# Patient Record
Sex: FEMALE | Race: BLACK OR AFRICAN AMERICAN | NOT HISPANIC OR LATINO | URBAN - METROPOLITAN AREA
[De-identification: names, ages, dates, MRNs, and addresses within clinical notes are randomized per-mention and may not be internally consistent; named-entity substitution may affect disease eponyms.]

---

## 2021-03-25 ENCOUNTER — TELEPHONE ENCOUNTER (OUTPATIENT)
Dept: URBAN - METROPOLITAN AREA CLINIC 40 | Facility: CLINIC | Age: 51
End: 2021-03-25

## 2021-03-25 ENCOUNTER — WEB ENCOUNTER (OUTPATIENT)
Dept: URBAN - METROPOLITAN AREA CLINIC 40 | Facility: CLINIC | Age: 51
End: 2021-03-25

## 2021-03-25 ENCOUNTER — OFFICE VISIT (OUTPATIENT)
Dept: URBAN - METROPOLITAN AREA CLINIC 40 | Facility: CLINIC | Age: 51
End: 2021-03-25
Payer: MEDICARE

## 2021-03-25 DIAGNOSIS — K29.70 GASTRITIS: ICD-10-CM

## 2021-03-25 DIAGNOSIS — K21.9 GERD: ICD-10-CM

## 2021-03-25 DIAGNOSIS — Z12.11 ENCOUNTER FOR SCREENING COLONOSCOPY: ICD-10-CM

## 2021-03-25 DIAGNOSIS — E66.01 MORBID OBESITY: ICD-10-CM

## 2021-03-25 DIAGNOSIS — Z80.0 FAMILY HISTORY OF COLON CANCER: ICD-10-CM

## 2021-03-25 DIAGNOSIS — K76.0 FATTY LIVER: ICD-10-CM

## 2021-03-25 DIAGNOSIS — Z95.0 PACEMAKER: ICD-10-CM

## 2021-03-25 DIAGNOSIS — Z98.84 S/P BARIATRIC SURGERY: ICD-10-CM

## 2021-03-25 PROCEDURE — 99204 OFFICE O/P NEW MOD 45 MIN: CPT | Performed by: PHYSICIAN ASSISTANT

## 2021-03-25 RX ORDER — DAPAGLIFLOZIN 10 MG/1
1 TABLET TABLET, FILM COATED ORAL ONCE A DAY
Status: ACTIVE | COMMUNITY

## 2021-03-25 RX ORDER — CARVEDILOL 25 MG/1
1 TABLET WITH FOOD TABLET, FILM COATED ORAL TWICE A DAY
Status: ACTIVE | COMMUNITY

## 2021-03-25 RX ORDER — METFORMIN HYDROCHLORIDE 1000 MG/1
1 TABLET WITH A MEAL TABLET, FILM COATED ORAL TWICE DAILY
Status: ACTIVE | COMMUNITY

## 2021-03-25 RX ORDER — SODIUM, POTASSIUM,MAG SULFATES 17.5-3.13G
ONCE SOLUTION, RECONSTITUTED, ORAL ORAL
Qty: 1 KIT | Refills: 0 | OUTPATIENT
Start: 2021-03-25 | End: 2021-03-26

## 2021-03-25 RX ORDER — PANTOPRAZOLE SODIUM 40 MG/1
1 TABLET TABLET, DELAYED RELEASE ORAL ONCE A DAY
Qty: 30 | Refills: 2 | OUTPATIENT

## 2021-03-25 RX ORDER — SPIRONOLACTONE 25 MG/1
1 TABLET TABLET, FILM COATED ORAL
Status: ACTIVE | COMMUNITY

## 2021-03-25 NOTE — HPI-TODAY'S VISIT:
She presents to the office today to schedule her screening colonoscopy.  States that she has mild constipation as she has been more sedentary.  Often skips meals.  Trying to increase her water intake.  Has chronic hip back in joint pain, soon to start aquatic physical therapy.  Denies any rectal bleeding or melena.  Takes OTC laxatives as needed.  Denies nausea or vomiting.  No dysphagia, however admits that she is having recurrent heartburn.  Believes that spicy food often triggers this as well as other acid producing foods.  If she avoids these, oftentimes she can avoid reflux.  When the reflux occurs, it may be after her heaviest meal of the day in the evening and reflux towards the neck.  Not currently on any acid reduction therapy.  Morbidly obese.  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  No myocardial infarction or stroke.  Not on any anticoagulant therapy.

## 2021-03-25 NOTE — PHYSICAL EXAM GASTROINTESTINAL
Abdomen  Obese, soft, nontender, nondistended , no guarding or rigidity , no masses palpable , normal bowel sounds , Liver and Spleen , no hepatomegaly present , no hepatosplenomegaly , liver nontender , spleen not palpable

## 2021-03-25 NOTE — PHYSICAL EXAM CONSTITUTIONAL:
well developed, morbidly obese, in no acute distress , ambulating without difficulty , normal communication ability

## 2021-03-25 NOTE — HPI-TODAY'S VISIT:
Mrs. Bruno is a 50 year old /Black female, who presents for screening colonoscopy. Last seen in 2015 with complaint of abdominal pain which was described as moderate to severe and sharp occurring anytime, and lasts minutes at a time. It did occur in the LUQ and improved by lying still and is aggravated by physical activity . A CT A/P at [facility] reportedly noted fatty liver, but was otherwise normal, per patient. Patient has a history of heartburn. Denies N/V or dysphagia. 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 Denies use of NSAIDs. Anemia following gastric surgery.An EGD in November 2015 with Dr. Sutherland was essentially normal outside of mild inflammation at the gastrojejunal anastomosis. She gives a family history of colon cancer in a first degree relative. Her November 2015 colonoscopy was normal.

## 2021-04-20 ENCOUNTER — OFFICE VISIT (OUTPATIENT)
Dept: URBAN - METROPOLITAN AREA MEDICAL CENTER 9 | Facility: MEDICAL CENTER | Age: 51
End: 2021-04-20

## 2021-05-04 ENCOUNTER — OFFICE VISIT (OUTPATIENT)
Dept: URBAN - METROPOLITAN AREA CLINIC 40 | Facility: CLINIC | Age: 51
End: 2021-05-04

## 2021-05-31 NOTE — PHYSICAL EXAM NECK/THYROID:
Medication Question or Clarification  Who is calling: Pharmacy: [facility]  What medication are you calling about? (include dose and sig) Losartan-HCTZ 100-25 mg tablet, 1 daily  Who prescribed the medication?: Dang Don, ZULEIKA   What is your question/concern?: Medication curently on backorder.  Please send prescription for losartan 100 mg and HCTZ 25 mg?  Pharmacy: [facility] #[id number]  Okay to leave a detailed message?: No  Site CMT - Please call the pharmacy to obtain any additional needed information.   normal appearance , trachea midline

## 2021-07-12 ENCOUNTER — ERX REFILL RESPONSE (OUTPATIENT)
Dept: URBAN - METROPOLITAN AREA CLINIC 40 | Facility: CLINIC | Age: 51
End: 2021-07-12

## 2021-07-12 RX ORDER — PANTOPRAZOLE SODIUM 40 MG/1
TAKE ONE TABLET BY MOUTH ONE TIME DAILY TABLET, DELAYED RELEASE ORAL
Qty: 30 TABLET | Refills: 3 | OUTPATIENT

## 2021-07-12 RX ORDER — PANTOPRAZOLE SODIUM 40 MG/1
1 TABLET TABLET, DELAYED RELEASE ORAL ONCE A DAY
Qty: 30 | Refills: 2 | OUTPATIENT

## 2021-07-21 ENCOUNTER — TELEPHONE ENCOUNTER (OUTPATIENT)
Dept: URBAN - METROPOLITAN AREA CLINIC 98 | Facility: CLINIC | Age: 51
End: 2021-07-21

## 2021-08-04 ENCOUNTER — OFFICE VISIT (OUTPATIENT)
Dept: URBAN - METROPOLITAN AREA CLINIC 40 | Facility: CLINIC | Age: 51
End: 2021-08-04

## 2021-08-04 RX ORDER — SPIRONOLACTONE 25 MG/1
1 TABLET TABLET, FILM COATED ORAL
Status: ACTIVE | COMMUNITY

## 2021-08-04 RX ORDER — DAPAGLIFLOZIN 10 MG/1
1 TABLET TABLET, FILM COATED ORAL ONCE A DAY
Status: ACTIVE | COMMUNITY

## 2021-08-04 RX ORDER — METFORMIN HYDROCHLORIDE 1000 MG/1
1 TABLET WITH A MEAL TABLET, FILM COATED ORAL TWICE DAILY
Status: ACTIVE | COMMUNITY

## 2021-08-04 RX ORDER — PANTOPRAZOLE SODIUM 40 MG/1
TAKE ONE TABLET BY MOUTH ONE TIME DAILY TABLET, DELAYED RELEASE ORAL
Qty: 30 TABLET | Refills: 3 | Status: ACTIVE | COMMUNITY

## 2021-08-04 RX ORDER — CARVEDILOL 25 MG/1
1 TABLET WITH FOOD TABLET, FILM COATED ORAL TWICE A DAY
Status: ACTIVE | COMMUNITY

## 2021-08-04 RX ORDER — PANTOPRAZOLE SODIUM 40 MG/1
1 TABLET TABLET, DELAYED RELEASE ORAL ONCE A DAY
Qty: 30 | Refills: 2 | OUTPATIENT

## 2021-08-04 NOTE — HPI-TODAY'S VISIT:
Mrs. Bruno is a 50 year old /Black female, who presents for screening colonoscopy. Last seen in 2015 with complaint of abdominal pain which was described as moderate to severe and sharp occurring anytime, and lasts minutes at a time. It did occur in the LUQ and improved by lying still and is aggravated by physical activity . A CT A/P at [facility] reportedly noted fatty liver, but was otherwise normal, per patient. Patient has a history of heartburn. Denies N/V or dysphagia. 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 Denies use of NSAIDs. Anemia following gastric surgery.An EGD in November 2015 with Dr. Sutherland was essentially normal outside of mild inflammation at the gastrojejunal anastomosis. She gives a family history of colon cancer in a first degree relative. Her November 2015 colonoscopy was normal.

## 2021-08-04 NOTE — HPI-TODAY'S VISIT:
She presents to the office today to schedule her screening colonoscopy.  States that she has mild constipation as she has been more sedentary.  Often skips meals.  Trying to increase her water intake.  Has chronic hip back in joint pain, soon to start aquatic physical therapy.  Denies any rectal bleeding or melena.  Takes OTC laxatives as needed.  Denies nausea or vomiting.  No dysphagia, however admits that she is having recurrent heartburn.  Believes that spicy food often triggers this as well as other acid producing foods.  If she avoids these, oftentimes she can avoid reflux.  When the reflux occurs, it may be after her heaviest meal of the day in the evening and reflux towards the neck.  Not currently on any acid reduction therapy.  Morbidly obese.  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  No myocardial infarction or stroke.  Not on any anticoagulant therapy.

## 2021-09-02 ENCOUNTER — OFFICE VISIT (OUTPATIENT)
Dept: URBAN - METROPOLITAN AREA CLINIC 40 | Facility: CLINIC | Age: 51
End: 2021-09-02
Payer: MEDICARE

## 2021-09-02 ENCOUNTER — WEB ENCOUNTER (OUTPATIENT)
Dept: URBAN - METROPOLITAN AREA CLINIC 40 | Facility: CLINIC | Age: 51
End: 2021-09-02

## 2021-09-02 VITALS
HEIGHT: 67 IN | TEMPERATURE: 97 F | HEART RATE: 66 BPM | DIASTOLIC BLOOD PRESSURE: 86 MMHG | SYSTOLIC BLOOD PRESSURE: 130 MMHG | WEIGHT: 293 LBS | BODY MASS INDEX: 45.99 KG/M2

## 2021-09-02 DIAGNOSIS — K29.70 GASTRITIS: ICD-10-CM

## 2021-09-02 DIAGNOSIS — K76.0 FATTY LIVER: ICD-10-CM

## 2021-09-02 DIAGNOSIS — K59.01 CONSTIPATION: ICD-10-CM

## 2021-09-02 DIAGNOSIS — K21.9 GERD: ICD-10-CM

## 2021-09-02 PROCEDURE — 99213 OFFICE O/P EST LOW 20 MIN: CPT | Performed by: PHYSICIAN ASSISTANT

## 2021-09-02 RX ORDER — CARVEDILOL 25 MG/1
1 TABLET WITH FOOD TABLET, FILM COATED ORAL TWICE A DAY
Status: ACTIVE | COMMUNITY

## 2021-09-02 RX ORDER — SODIUM, POTASSIUM,MAG SULFATES 17.5-3.13G
ONCE SOLUTION, RECONSTITUTED, ORAL ORAL
Qty: 1 KIT | Refills: 0 | OUTPATIENT
Start: 2021-09-03 | End: 2021-09-04

## 2021-09-02 RX ORDER — SPIRONOLACTONE 25 MG/1
1 TABLET TABLET, FILM COATED ORAL
Status: ACTIVE | COMMUNITY

## 2021-09-02 RX ORDER — METFORMIN HYDROCHLORIDE 1000 MG/1
1 TABLET WITH A MEAL TABLET, FILM COATED ORAL TWICE DAILY
Status: ACTIVE | COMMUNITY

## 2021-09-02 RX ORDER — PANTOPRAZOLE SODIUM 40 MG/1
1 TABLET TABLET, DELAYED RELEASE ORAL ONCE A DAY
Qty: 30 | Refills: 2 | Status: ACTIVE | COMMUNITY

## 2021-09-02 RX ORDER — PANTOPRAZOLE SODIUM 40 MG/1
1 TABLET TABLET, DELAYED RELEASE ORAL ONCE A DAY
Qty: 30 | Refills: 2 | OUTPATIENT

## 2021-09-02 RX ORDER — DAPAGLIFLOZIN 10 MG/1
1 TABLET TABLET, FILM COATED ORAL ONCE A DAY
Status: ACTIVE | COMMUNITY

## 2021-09-02 RX ORDER — PANTOPRAZOLE SODIUM 40 MG/1
TAKE ONE TABLET BY MOUTH ONE TIME DAILY TABLET, DELAYED RELEASE ORAL
Qty: 30 TABLET | Refills: 3 | Status: ACTIVE | COMMUNITY

## 2021-09-02 NOTE — HPI-TODAY'S VISIT:
Mrs. Bruno is a 51 year old /Black female, who presents for screening colonoscopy. Seen earlier this year to schedule procedures for evaluation of GERD, screening colonoscopy. Back in 2015, she was seen with complaint of abdominal pain which was described as moderate to severe and sharp occurring anytime, and lasts minutes at a time. It did occur in the LUQ and improved by lying still and is aggravated by physical activity . A CT A/P at [facility] reportedly noted fatty liver, but was otherwise normal, per patient. Patient has a history of heartburn. Denies N/V or dysphagia. 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 Denies use of NSAIDs. Anemia following gastric surgery.An EGD in November 2015 with Dr. Sutherland was essentially normal outside of mild inflammation at the gastrojejunal anastomosis. She gives a family history of colon cancer in a first degree relative. Her November 2015 colonoscopy was normal.  She returns to the office today to reschedule her screening colonoscopy.  States that she has mild constipation as she has been more sedentary.  Often skips meals.  Trying to increase her water intake.  Has chronic hip back in joint pain, soon to start aquatic physical therapy.  Denies any rectal bleeding or melena.  Takes OTC laxatives as needed.  Denies nausea or vomiting.  No dysphagia, however admits that she is having recurrent heartburn.  Believes that spicy food often triggers this as well as other acid producing foods.  If she avoids these, oftentimes she can avoid reflux.  When the reflux occurs, it may be after her heaviest meal of the day in the evening and reflux towards the neck.  Taking pantoprazole daily.  Morbidly obese.  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  No myocardial infarction or stroke.  Not on any anticoagulant therapy. Presents today to reschedule colonoscopy with pending CV clearance.

## 2021-09-30 ENCOUNTER — TELEPHONE ENCOUNTER (OUTPATIENT)
Dept: URBAN - METROPOLITAN AREA CLINIC 40 | Facility: CLINIC | Age: 51
End: 2021-09-30

## 2021-10-05 ENCOUNTER — TELEPHONE ENCOUNTER (OUTPATIENT)
Dept: URBAN - METROPOLITAN AREA CLINIC 40 | Facility: CLINIC | Age: 51
End: 2021-10-05

## 2022-01-06 ENCOUNTER — ERX REFILL RESPONSE (OUTPATIENT)
Dept: URBAN - METROPOLITAN AREA CLINIC 40 | Facility: CLINIC | Age: 52
End: 2022-01-06

## 2022-01-06 RX ORDER — PANTOPRAZOLE SODIUM 40 MG/1
TAKE ONE TABLET BY MOUTH ONE TIME DAILY TABLET, DELAYED RELEASE ORAL
Qty: 30 TABLET | Refills: 3 | OUTPATIENT

## 2022-04-06 ENCOUNTER — ERX REFILL RESPONSE (OUTPATIENT)
Dept: URBAN - METROPOLITAN AREA CLINIC 40 | Facility: CLINIC | Age: 52
End: 2022-04-06

## 2022-04-06 RX ORDER — PANTOPRAZOLE SODIUM 40 MG/1
TAKE ONE TABLET BY MOUTH ONE TIME DAILY TABLET, DELAYED RELEASE ORAL
Qty: 30 TABLET | Refills: 3 | OUTPATIENT

## 2022-06-22 ENCOUNTER — OFFICE VISIT (OUTPATIENT)
Dept: URBAN - METROPOLITAN AREA CLINIC 74 | Facility: CLINIC | Age: 52
End: 2022-06-22

## 2022-07-15 ENCOUNTER — ERX REFILL RESPONSE (OUTPATIENT)
Dept: URBAN - METROPOLITAN AREA CLINIC 40 | Facility: CLINIC | Age: 52
End: 2022-07-15

## 2022-07-15 RX ORDER — PANTOPRAZOLE SODIUM 40 MG/1
TAKE ONE TABLET BY MOUTH ONE TIME DAILY TABLET, DELAYED RELEASE ORAL
Qty: 30 TABLET | Refills: 2 | OUTPATIENT

## 2022-07-15 RX ORDER — PANTOPRAZOLE SODIUM 40 MG/1
TAKE ONE TABLET BY MOUTH ONE TIME DAILY TABLET, DELAYED RELEASE ORAL
Qty: 30 TABLET | Refills: 3 | OUTPATIENT

## 2022-07-18 ENCOUNTER — ERX REFILL RESPONSE (OUTPATIENT)
Dept: URBAN - METROPOLITAN AREA CLINIC 40 | Facility: CLINIC | Age: 52
End: 2022-07-18

## 2022-07-18 RX ORDER — PANTOPRAZOLE SODIUM 40 MG/1
TAKE ONE TABLET BY MOUTH ONE TIME DAILY TABLET, DELAYED RELEASE ORAL
Qty: 30 TABLET | Refills: 2 | OUTPATIENT

## 2022-07-18 RX ORDER — PANTOPRAZOLE SODIUM 40 MG/1
TAKE ONE TABLET BY MOUTH ONE TIME DAILY TABLET, DELAYED RELEASE ORAL
Qty: 30 TABLET | Refills: 3 | OUTPATIENT

## 2022-08-02 ENCOUNTER — OFFICE VISIT (OUTPATIENT)
Dept: URBAN - METROPOLITAN AREA CLINIC 40 | Facility: CLINIC | Age: 52
End: 2022-08-02
Payer: MEDICARE

## 2022-08-02 VITALS
WEIGHT: 293 LBS | HEART RATE: 100 BPM | HEIGHT: 67 IN | SYSTOLIC BLOOD PRESSURE: 128 MMHG | BODY MASS INDEX: 45.99 KG/M2 | TEMPERATURE: 97.6 F | DIASTOLIC BLOOD PRESSURE: 86 MMHG

## 2022-08-02 DIAGNOSIS — K59.01 CONSTIPATION: ICD-10-CM

## 2022-08-02 DIAGNOSIS — Z80.0 FAMILY HISTORY OF COLON CANCER: ICD-10-CM

## 2022-08-02 DIAGNOSIS — Z95.0 PACEMAKER: ICD-10-CM

## 2022-08-02 DIAGNOSIS — K21.9 GERD: ICD-10-CM

## 2022-08-02 PROBLEM — 238136002 MORBID OBESITY: Status: ACTIVE | Noted: 2021-03-25

## 2022-08-02 PROBLEM — 4556007 GASTRITIS: Status: ACTIVE | Noted: 2021-03-25

## 2022-08-02 PROCEDURE — 99213 OFFICE O/P EST LOW 20 MIN: CPT | Performed by: PHYSICIAN ASSISTANT

## 2022-08-02 RX ORDER — SPIRONOLACTONE 25 MG/1
1 TABLET TABLET, FILM COATED ORAL
Status: ACTIVE | COMMUNITY

## 2022-08-02 RX ORDER — DAPAGLIFLOZIN 10 MG/1
1 TABLET TABLET, FILM COATED ORAL ONCE A DAY
Status: ACTIVE | COMMUNITY

## 2022-08-02 RX ORDER — METFORMIN HYDROCHLORIDE 1000 MG/1
1 TABLET WITH A MEAL TABLET, FILM COATED ORAL TWICE DAILY
Status: ACTIVE | COMMUNITY

## 2022-08-02 RX ORDER — CARVEDILOL 25 MG/1
1 TABLET WITH FOOD TABLET, FILM COATED ORAL TWICE A DAY
Status: ACTIVE | COMMUNITY

## 2022-08-02 RX ORDER — PANTOPRAZOLE SODIUM 40 MG/1
TAKE ONE TABLET BY MOUTH ONE TIME DAILY TABLET, DELAYED RELEASE ORAL
Qty: 30 TABLET | Refills: 2 | Status: ACTIVE | COMMUNITY

## 2022-08-02 NOTE — HPI-TODAY'S VISIT:
Mrs. Bruno is a 52 year old Black female, last seen 9/2/21 for screening colonoscopy. Seen earlier this year to schedule procedures for evaluation of GERD, screening colonoscopy. Back in 2015, she was seen with complaint of abdominal pain which was described as moderate to severe and sharp occurring anytime, and lasts minutes at a time. It did occur in the LUQ and improved by lying still and is aggravated by physical activity . A CT A/P at [facility] reportedly noted fatty liver, but was otherwise normal, per patient. Patient has a history of heartburn. Denies N/V or dysphagia. 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 Denies use of NSAIDs. Anemia following gastric surgery.An EGD in November 2015 with Dr. Sutherland was essentially normal outside of mild inflammation at the gastrojejunal anastomosis. She gives a family history of colon cancer in a first degree relative. Her November 2015 colonoscopy was normal.  She returns to the office today to reschedule her screening colonoscopy.  States that she has mild constipation as she has been more sedentary.  Often skips meals.  Trying to increase her water intake.  Has chronic hip back in joint pain, soon to start aquatic physical therapy.  Denies any rectal bleeding or melena.  Takes OTC laxatives as needed.  Denies nausea or vomiting.  No dysphagia, however admits that she is having recurrent heartburn.  Believes that spicy food often triggers this as well as other acid producing foods.  If she avoids these, oftentimes she can avoid reflux.  When the reflux occurs, it may be after her heaviest meal of the day in the evening and reflux towards the neck.  Taking pantoprazole daily.  Morbidly obese.  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  No myocardial infarction or stroke.  Not on any anticoagulant therapy. Presents today to reschedule colonoscopy with pending CV clearance.

## 2022-08-04 PROBLEM — 441509002: Status: ACTIVE | Noted: 2021-03-25

## 2022-08-04 PROBLEM — 14760008 CONSTIPATION: Status: ACTIVE | Noted: 2021-09-02

## 2022-08-04 PROBLEM — 235595009 GASTROESOPHAGEAL REFLUX DISEASE: Status: ACTIVE | Noted: 2021-03-25

## 2022-08-05 ENCOUNTER — TELEPHONE ENCOUNTER (OUTPATIENT)
Dept: URBAN - METROPOLITAN AREA CLINIC 40 | Facility: CLINIC | Age: 52
End: 2022-08-05

## 2022-11-01 ENCOUNTER — OFFICE VISIT (OUTPATIENT)
Dept: URBAN - METROPOLITAN AREA MEDICAL CENTER 9 | Facility: MEDICAL CENTER | Age: 52
End: 2022-11-01

## 2022-11-23 ENCOUNTER — OFFICE VISIT (OUTPATIENT)
Dept: URBAN - METROPOLITAN AREA CLINIC 40 | Facility: CLINIC | Age: 52
End: 2022-11-23

## 2022-12-06 ENCOUNTER — OFFICE VISIT (OUTPATIENT)
Dept: URBAN - METROPOLITAN AREA MEDICAL CENTER 9 | Facility: MEDICAL CENTER | Age: 52
End: 2022-12-06

## 2022-12-27 ENCOUNTER — ERX REFILL RESPONSE (OUTPATIENT)
Dept: URBAN - METROPOLITAN AREA CLINIC 40 | Facility: CLINIC | Age: 52
End: 2022-12-27

## 2022-12-27 RX ORDER — PANTOPRAZOLE SODIUM 40 MG/1
TAKE ONE TABLET BY MOUTH ONE TIME DAILY TABLET, DELAYED RELEASE ORAL
Qty: 30 TABLET | Refills: 2 | OUTPATIENT

## 2023-03-17 ENCOUNTER — TELEPHONE ENCOUNTER (OUTPATIENT)
Dept: URBAN - METROPOLITAN AREA CLINIC 40 | Facility: CLINIC | Age: 53
End: 2023-03-17

## 2023-03-31 ENCOUNTER — OFFICE VISIT (OUTPATIENT)
Dept: URBAN - METROPOLITAN AREA TELEHEALTH 2 | Facility: TELEHEALTH | Age: 53
End: 2023-03-31

## 2023-03-31 ENCOUNTER — TELEPHONE ENCOUNTER (OUTPATIENT)
Dept: URBAN - METROPOLITAN AREA CLINIC 35 | Facility: CLINIC | Age: 53
End: 2023-03-31

## 2023-03-31 VITALS — WEIGHT: 293 LBS | HEIGHT: 67 IN | BODY MASS INDEX: 45.99 KG/M2

## 2023-03-31 RX ORDER — MELOXICAM 7.5 MG
1 TABLET TABLET ORAL ONCE A DAY
Status: ACTIVE | COMMUNITY

## 2023-03-31 RX ORDER — PANTOPRAZOLE SODIUM 40 MG/1
TAKE ONE TABLET BY MOUTH ONE TIME DAILY TABLET, DELAYED RELEASE ORAL
Qty: 30 TABLET | Refills: 2 | Status: ACTIVE | COMMUNITY

## 2023-03-31 RX ORDER — SPIRONOLACTONE 25 MG/1
1 TABLET TABLET, FILM COATED ORAL
Status: ACTIVE | COMMUNITY

## 2023-03-31 RX ORDER — METFORMIN HYDROCHLORIDE 1000 MG/1
1 TABLET WITH A MEAL TABLET, FILM COATED ORAL TWICE DAILY
Status: ACTIVE | COMMUNITY

## 2023-03-31 RX ORDER — DAPAGLIFLOZIN 10 MG/1
1 TABLET TABLET, FILM COATED ORAL ONCE A DAY
Status: ACTIVE | COMMUNITY

## 2023-03-31 RX ORDER — CARVEDILOL 25 MG/1
1 TABLET WITH FOOD TABLET, FILM COATED ORAL TWICE A DAY
Status: ACTIVE | COMMUNITY

## 2023-03-31 NOTE — HPI-TODAY'S VISIT:
Mrs. Bruno is a 52 year old Black female, last seen 8/2/22 for screening colonoscopy. Seen earlier this year to schedule procedures for evaluation of GERD, screening colonoscopy. Back in 2015, she was seen with complaint of abdominal pain which was described as moderate to severe and sharp occurring anytime, and lasts minutes at a time. It did occur in the LUQ and improved by lying still and is aggravated by physical activity . A CT A/P at [facility] reportedly noted fatty liver, but was otherwise normal, per patient. Patient has a history of heartburn. Denies N/V or dysphagia. 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 Denies use of NSAIDs. Anemia following gastric surgery.An EGD in November 2015 with Dr. Sutherland was essentially normal outside of mild inflammation at the gastrojejunal anastomosis. She gives a family history of colon cancer in a first degree relative. Her November 2015 colonoscopy was normal.  Last seen planning to reschedule her screening colonoscopy.  States that she has mild constipation as she has been more sedentary.  Often skips meals.  Trying to increase her water intake.  Has chronic hip back in joint pain, soon to start aquatic physical therapy.  Denies any rectal bleeding or melena.  Takes OTC laxatives as needed.  Denies nausea or vomiting.  No dysphagia, however admits that she is having recurrent heartburn.  Believes that spicy food often triggers this as well as other acid producing foods.  If she avoids these, oftentimes she can avoid reflux.  When the reflux occurs, it may be after her heaviest meal of the day in the evening and reflux towards the neck.  Taking pantoprazole daily.  Morbidly obese.  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  No myocardial infarction or stroke.  Not on any anticoagulant therapy. Presents today to reschedule colonoscopy with pending CV clearance.

## 2023-04-05 ENCOUNTER — TELEPHONE ENCOUNTER (OUTPATIENT)
Dept: URBAN - METROPOLITAN AREA CLINIC 80 | Facility: CLINIC | Age: 53
End: 2023-04-05

## 2023-04-06 ENCOUNTER — LAB OUTSIDE AN ENCOUNTER (OUTPATIENT)
Dept: URBAN - METROPOLITAN AREA CLINIC 40 | Facility: CLINIC | Age: 53
End: 2023-04-06

## 2023-04-06 ENCOUNTER — OFFICE VISIT (OUTPATIENT)
Dept: URBAN - METROPOLITAN AREA CLINIC 40 | Facility: CLINIC | Age: 53
End: 2023-04-06
Payer: MEDICARE

## 2023-04-06 ENCOUNTER — TELEPHONE ENCOUNTER (OUTPATIENT)
Dept: URBAN - METROPOLITAN AREA CLINIC 40 | Facility: CLINIC | Age: 53
End: 2023-04-06

## 2023-04-06 VITALS
HEIGHT: 67 IN | TEMPERATURE: 97.5 F | DIASTOLIC BLOOD PRESSURE: 80 MMHG | HEART RATE: 82 BPM | BODY MASS INDEX: 45.99 KG/M2 | SYSTOLIC BLOOD PRESSURE: 136 MMHG | WEIGHT: 293 LBS

## 2023-04-06 DIAGNOSIS — Z12.11 ENCOUNTER FOR SCREENING COLONOSCOPY: ICD-10-CM

## 2023-04-06 DIAGNOSIS — R10.12 LEFT UPPER QUADRANT PAIN: ICD-10-CM

## 2023-04-06 DIAGNOSIS — K21.9 GERD: ICD-10-CM

## 2023-04-06 PROCEDURE — 99214 OFFICE O/P EST MOD 30 MIN: CPT | Performed by: INTERNAL MEDICINE

## 2023-04-06 RX ORDER — SPIRONOLACTONE 25 MG/1
1 TABLET TABLET, FILM COATED ORAL
Status: ACTIVE | COMMUNITY

## 2023-04-06 RX ORDER — METFORMIN HYDROCHLORIDE 1000 MG/1
1 TABLET WITH A MEAL TABLET, FILM COATED ORAL TWICE DAILY
Status: ACTIVE | COMMUNITY

## 2023-04-06 RX ORDER — MELOXICAM 7.5 MG
1 TABLET TABLET ORAL ONCE A DAY
Status: ACTIVE | COMMUNITY

## 2023-04-06 RX ORDER — PANTOPRAZOLE SODIUM 40 MG/1
TAKE ONE TABLET BY MOUTH ONE TIME DAILY TABLET, DELAYED RELEASE ORAL
Qty: 30 TABLET | Refills: 2 | Status: ACTIVE | COMMUNITY

## 2023-04-06 RX ORDER — CARVEDILOL 25 MG/1
1 TABLET WITH FOOD TABLET, FILM COATED ORAL TWICE A DAY
Status: ACTIVE | COMMUNITY

## 2023-04-06 RX ORDER — SUCRALFATE 1 G/1
1 TABLET ON AN EMPTY STOMACH TABLET ORAL TWICE A DAY
Qty: 60 TABLET | Refills: 3 | OUTPATIENT
Start: 2023-04-06 | End: 2023-08-04

## 2023-04-06 RX ORDER — DAPAGLIFLOZIN 10 MG/1
1 TABLET TABLET, FILM COATED ORAL ONCE A DAY
Status: ACTIVE | COMMUNITY

## 2023-04-06 NOTE — HPI-TODAY'S VISIT:
Referred by Dr. Zepeda. Anemia. Hgb 9.9 MCV 66 Iron 6 Ferritin not checked CMP normal, Cr normal. On PO iron. Mrs. Bruno is a 52 year old Black female, last seen 2022 for screening colonoscopy by [facility] (and seen year prior similar), colonoscopy has been scheduled twice, but not completed. Seen earlier this year to  schedule procedures for evaluation of GERD, screening colonoscopy.   Patient has a history of heartburn. Denies N/V or dysphagia. Eats small meals. Hx of RouxEnY gastric bypass in 2002 with revision in 2013 by bariatric surgeon in [place], [place].  Denies use of NSAIDs. Anemia following gastric surgery. An EGD in November 2015 with Dr. Sutherland was essentially normal outside of mild inflammation at the gastrojejunal anastomosis.  She gives a family history of colon cancer in a first degree relative.  Colonoscopy normal November 2015 colonoscopy, due for colonoscopy now (not completed).   States that she has mild constipation as she has been more sedentary.  Often skips meals.  Trying to increase her water intake.  Has chronic hip back in joint pain, soon to start aquatic physical therapy.  Denies any rectal bleeding or melena.  Takes OTC laxatives as needed.  Denies nausea or vomiting.  No dysphagia, however admits that she is having recurrent heartburn.  Believes that spicy food often triggers this as well as other acid producing foods.  If she avoids these, oftentimes she can avoid reflux.  When the reflux occurs, it may be after her heaviest meal of the day in the evening and reflux towards the neck.  Taking pantoprazole daily.  Morbidly obese.  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  No myocardial infarction or stroke.  Not on any anticoagulant therapy.

## 2023-04-12 ENCOUNTER — TELEPHONE ENCOUNTER (OUTPATIENT)
Dept: URBAN - METROPOLITAN AREA CLINIC 40 | Facility: CLINIC | Age: 53
End: 2023-04-12

## 2023-04-12 RX ORDER — DICYCLOMINE HYDROCHLORIDE 20 MG/1
1 TABLET TABLET ORAL THREE TIMES A DAY
Qty: 270 TABLET | Refills: 3 | OUTPATIENT
Start: 2023-04-13 | End: 2024-04-07

## 2023-07-27 ENCOUNTER — OFFICE VISIT (OUTPATIENT)
Dept: URBAN - METROPOLITAN AREA MEDICAL CENTER 9 | Facility: MEDICAL CENTER | Age: 53
End: 2023-07-27
Payer: MEDICARE

## 2023-07-27 DIAGNOSIS — Z98.0 H/O BILLROTH II OPERATION: ICD-10-CM

## 2023-07-27 DIAGNOSIS — D50.9 ANEMIA: ICD-10-CM

## 2023-07-27 PROCEDURE — 43235 EGD DIAGNOSTIC BRUSH WASH: CPT | Performed by: INTERNAL MEDICINE

## 2023-07-27 PROCEDURE — 45378 DIAGNOSTIC COLONOSCOPY: CPT | Performed by: INTERNAL MEDICINE

## 2023-07-27 RX ORDER — DAPAGLIFLOZIN 10 MG/1
1 TABLET TABLET, FILM COATED ORAL ONCE A DAY
Status: ACTIVE | COMMUNITY

## 2023-07-27 RX ORDER — METFORMIN HYDROCHLORIDE 1000 MG/1
1 TABLET WITH A MEAL TABLET, FILM COATED ORAL TWICE DAILY
Status: ACTIVE | COMMUNITY

## 2023-07-27 RX ORDER — SPIRONOLACTONE 25 MG/1
1 TABLET TABLET, FILM COATED ORAL
Status: ACTIVE | COMMUNITY

## 2023-07-27 RX ORDER — DICYCLOMINE HYDROCHLORIDE 20 MG/1
1 TABLET TABLET ORAL THREE TIMES A DAY
Qty: 270 TABLET | Refills: 3 | Status: ACTIVE | COMMUNITY
Start: 2023-04-13 | End: 2024-04-07

## 2023-07-27 RX ORDER — PANTOPRAZOLE SODIUM 40 MG/1
TAKE ONE TABLET BY MOUTH ONE TIME DAILY TABLET, DELAYED RELEASE ORAL
Qty: 30 TABLET | Refills: 2 | Status: ACTIVE | COMMUNITY

## 2023-07-27 RX ORDER — SUCRALFATE 1 G/1
1 TABLET ON AN EMPTY STOMACH TABLET ORAL TWICE A DAY
Qty: 60 TABLET | Refills: 3 | Status: ACTIVE | COMMUNITY
Start: 2023-04-06 | End: 2023-08-04

## 2023-07-27 RX ORDER — MELOXICAM 7.5 MG
1 TABLET TABLET ORAL ONCE A DAY
Status: ACTIVE | COMMUNITY

## 2023-07-27 RX ORDER — CARVEDILOL 25 MG/1
1 TABLET WITH FOOD TABLET, FILM COATED ORAL TWICE A DAY
Status: ACTIVE | COMMUNITY

## 2023-08-24 ENCOUNTER — OFFICE VISIT (OUTPATIENT)
Dept: URBAN - METROPOLITAN AREA CLINIC 40 | Facility: CLINIC | Age: 53
End: 2023-08-24

## 2023-08-24 RX ORDER — DICYCLOMINE HYDROCHLORIDE 20 MG/1
1 TABLET TABLET ORAL THREE TIMES A DAY
Qty: 270 TABLET | Refills: 3 | COMMUNITY
Start: 2023-04-13 | End: 2024-04-07

## 2023-08-24 RX ORDER — MELOXICAM 7.5 MG
1 TABLET TABLET ORAL ONCE A DAY
COMMUNITY

## 2023-08-24 RX ORDER — METFORMIN HYDROCHLORIDE 1000 MG/1
1 TABLET WITH A MEAL TABLET, FILM COATED ORAL TWICE DAILY
COMMUNITY

## 2023-08-24 RX ORDER — SPIRONOLACTONE 25 MG/1
1 TABLET TABLET, FILM COATED ORAL
COMMUNITY

## 2023-08-24 RX ORDER — PANTOPRAZOLE SODIUM 40 MG/1
TAKE ONE TABLET BY MOUTH ONE TIME DAILY TABLET, DELAYED RELEASE ORAL
Qty: 30 TABLET | Refills: 2 | COMMUNITY

## 2023-08-24 RX ORDER — DAPAGLIFLOZIN 10 MG/1
1 TABLET TABLET, FILM COATED ORAL ONCE A DAY
COMMUNITY

## 2023-08-24 RX ORDER — CARVEDILOL 25 MG/1
1 TABLET WITH FOOD TABLET, FILM COATED ORAL TWICE A DAY
COMMUNITY

## 2023-10-30 ENCOUNTER — DASHBOARD ENCOUNTERS (OUTPATIENT)
Age: 53
End: 2023-10-30

## 2023-11-01 ENCOUNTER — LAB OUTSIDE AN ENCOUNTER (OUTPATIENT)
Dept: URBAN - METROPOLITAN AREA CLINIC 40 | Facility: CLINIC | Age: 53
End: 2023-11-01

## 2023-11-01 ENCOUNTER — OFFICE VISIT (OUTPATIENT)
Dept: URBAN - METROPOLITAN AREA CLINIC 40 | Facility: CLINIC | Age: 53
End: 2023-11-01
Payer: MEDICARE

## 2023-11-01 VITALS
HEIGHT: 67 IN | TEMPERATURE: 96.8 F | WEIGHT: 293 LBS | SYSTOLIC BLOOD PRESSURE: 120 MMHG | BODY MASS INDEX: 45.99 KG/M2 | HEART RATE: 84 BPM | DIASTOLIC BLOOD PRESSURE: 76 MMHG

## 2023-11-01 DIAGNOSIS — K76.0 FATTY LIVER: ICD-10-CM

## 2023-11-01 DIAGNOSIS — D64.89 ANEMIA DUE TO OTHER CAUSE: ICD-10-CM

## 2023-11-01 DIAGNOSIS — K21.9 GERD: ICD-10-CM

## 2023-11-01 DIAGNOSIS — Z80.0 FAMILY HISTORY OF COLON CANCER: ICD-10-CM

## 2023-11-01 PROCEDURE — 99214 OFFICE O/P EST MOD 30 MIN: CPT | Performed by: INTERNAL MEDICINE

## 2023-11-01 RX ORDER — DICYCLOMINE HYDROCHLORIDE 20 MG/1
1 TABLET TABLET ORAL THREE TIMES A DAY
Qty: 270 TABLET | Refills: 3 | Status: ACTIVE | COMMUNITY
Start: 2023-04-13 | End: 2024-04-07

## 2023-11-01 RX ORDER — PANTOPRAZOLE SODIUM 40 MG/1
TAKE ONE TABLET BY MOUTH ONE TIME DAILY TABLET, DELAYED RELEASE ORAL
Qty: 30 TABLET | Refills: 2 | Status: ACTIVE | COMMUNITY

## 2023-11-01 RX ORDER — DAPAGLIFLOZIN 10 MG/1
1 TABLET TABLET, FILM COATED ORAL ONCE A DAY
Status: ACTIVE | COMMUNITY

## 2023-11-01 RX ORDER — AMITRIPTYLINE HYDROCHLORIDE 10 MG/1
1 TABLET AT BEDTIME TABLET, FILM COATED ORAL ONCE A DAY
Qty: 90 TABLET | Refills: 3 | OUTPATIENT
Start: 2023-11-01

## 2023-11-01 RX ORDER — CARVEDILOL 25 MG/1
1 TABLET WITH FOOD TABLET, FILM COATED ORAL TWICE A DAY
Status: ACTIVE | COMMUNITY

## 2023-11-01 RX ORDER — SPIRONOLACTONE 25 MG/1
1 TABLET TABLET, FILM COATED ORAL
Status: ACTIVE | COMMUNITY

## 2023-11-01 RX ORDER — METFORMIN HYDROCHLORIDE 1000 MG/1
1 TABLET WITH A MEAL TABLET, FILM COATED ORAL TWICE DAILY
Status: ACTIVE | COMMUNITY

## 2023-11-01 RX ORDER — PANTOPRAZOLE SODIUM 40 MG/1
1 TABLET TABLET, DELAYED RELEASE ORAL TWICE A DAY
Qty: 180 TABLET | Refills: 3 | OUTPATIENT
Start: 2023-11-01

## 2023-11-01 RX ORDER — MELOXICAM 7.5 MG
1 TABLET TABLET ORAL ONCE A DAY
Status: ACTIVE | COMMUNITY

## 2023-11-01 NOTE — HPI-TODAY'S VISIT:
Follow-up after procedures.  Seen in April for anemia and abdominal pain, history of Valentin-en-Y gastric bypass.EGD July 2023 showed small hiatal hernia, Valentin-en-Y gastric bypass otherwise normal, biopsies negative for H. pylori. Colonoscopy July 2023 was negative for polyps, minimal diverticulosis noted, no overt hemorrhoids. CT abdomen pelvis April 2023 showed postoperative change from gastric surgery, mild hepatomegaly but no overt fatty liver, no acute changes otherwise noted. Anemia, receiving IV iron with hematology now. Still c/o upper abd pain, dull, grabbing, intermittent, nonradiatitng.  GERD, not controlled with Protonix once daily, ongoing heartburn.

## 2025-05-13 ENCOUNTER — OFFICE VISIT (OUTPATIENT)
Dept: URBAN - METROPOLITAN AREA CLINIC 40 | Facility: CLINIC | Age: 55
End: 2025-05-13

## 2025-05-13 RX ORDER — SPIRONOLACTONE 25 MG/1
1 TABLET TABLET, FILM COATED ORAL
Status: ACTIVE | COMMUNITY

## 2025-05-13 RX ORDER — AMITRIPTYLINE HYDROCHLORIDE 10 MG/1
1 TABLET AT BEDTIME TABLET, FILM COATED ORAL ONCE A DAY
Qty: 90 TABLET | Refills: 3 | Status: ACTIVE | COMMUNITY
Start: 2023-11-01

## 2025-05-13 RX ORDER — CARVEDILOL 25 MG/1
1 TABLET WITH FOOD TABLET, FILM COATED ORAL TWICE A DAY
Status: ACTIVE | COMMUNITY

## 2025-05-13 RX ORDER — MELOXICAM 7.5 MG
1 TABLET TABLET ORAL ONCE A DAY
Status: ACTIVE | COMMUNITY

## 2025-05-13 RX ORDER — METFORMIN HYDROCHLORIDE 1000 MG/1
1 TABLET WITH A MEAL TABLET, FILM COATED ORAL TWICE DAILY
Status: ACTIVE | COMMUNITY

## 2025-05-13 RX ORDER — PANTOPRAZOLE SODIUM 40 MG/1
TAKE ONE TABLET BY MOUTH ONE TIME DAILY TABLET, DELAYED RELEASE ORAL
Qty: 30 TABLET | Refills: 2 | Status: ACTIVE | COMMUNITY

## 2025-05-13 RX ORDER — PANTOPRAZOLE SODIUM 40 MG/1
1 TABLET TABLET, DELAYED RELEASE ORAL TWICE A DAY
Qty: 180 TABLET | Refills: 3 | Status: ACTIVE | COMMUNITY
Start: 2023-11-01

## 2025-05-13 RX ORDER — DAPAGLIFLOZIN 10 MG/1
1 TABLET TABLET, FILM COATED ORAL ONCE A DAY
Status: ACTIVE | COMMUNITY